# Patient Record
Sex: FEMALE | Race: WHITE | NOT HISPANIC OR LATINO
[De-identification: names, ages, dates, MRNs, and addresses within clinical notes are randomized per-mention and may not be internally consistent; named-entity substitution may affect disease eponyms.]

---

## 2024-01-01 ENCOUNTER — HOSPITAL ENCOUNTER (INPATIENT)
Age: 59
End: 2024-01-01
Attending: SURGERY | Admitting: SURGERY
Payer: COMMERCIAL

## 2024-02-06 ENCOUNTER — DOCUMENTATION (OUTPATIENT)
Dept: TRANSPLANT | Facility: HOSPITAL | Age: 59
End: 2024-02-06
Payer: COMMERCIAL

## 2024-02-06 ENCOUNTER — TELEPHONE (OUTPATIENT)
Dept: TRANSPLANT | Facility: HOSPITAL | Age: 59
End: 2024-02-06
Payer: COMMERCIAL

## 2024-02-06 VITALS — HEIGHT: 62 IN | WEIGHT: 238.1 LBS | BODY MASS INDEX: 43.82 KG/M2

## 2024-02-06 DIAGNOSIS — N18.6 ESRD (END STAGE RENAL DISEASE) (MULTI): Primary | ICD-10-CM

## 2024-02-06 NOTE — PROGRESS NOTES
Do you have difficulty reading or writing in English?   no   What is the primary cause of your kidney disease?  Diabetes  Are you currently on dialysis?   yes  If yes, what days do you have your dialysis treatments?   HHD                                  Have you received a transplant before?   no  If yes, what organ, and when and where was your transplant?     Have you been diagnosed with diabetes?    yes  Have you tested positive for hepatitis or HIV?   yes, Hep C  Have you ever been diagnosed with cancer?   no  If yes, what type of cancer, and when and where were you treated?     Do you have a history of a heart attack or stroke?   Yes, Heart Attack 2017, Treated [facility]  Are you currently or have you previously been seen by a mental health professional?   no  If yes, what is the name of your mental health provider?    Are you a current or former tobacco user?   yes, Quit 2023  Do you have history of alcohol abuse or dependence?   no  Do you have a history of illegal drug abuse or dependence?   no  Has anyone told you they're willing to donate their kidney to you?   yes  Comments:  Intake complete, scheduled virtual [facility].

## 2024-02-16 ENCOUNTER — TELEPHONE (OUTPATIENT)
Dept: TRANSPLANT | Facility: HOSPITAL | Age: 59
End: 2024-02-16
Payer: COMMERCIAL

## 2024-02-23 ENCOUNTER — DOCUMENTATION (OUTPATIENT)
Dept: TRANSPLANT | Facility: HOSPITAL | Age: 59
End: 2024-02-23
Payer: COMMERCIAL

## 2024-02-23 NOTE — PROGRESS NOTES
Per EV BCBS a/e 01/01/23 1600.00 ded 80% 5000.00 oop max, Medicare A&B a/e 10/01/23 & shows secondary, [employer] Medicaid active.  Rec'd fax back from [facility] stating this is a [place] of [place] policy.  Listing approval will be needed.

## 2024-03-05 ENCOUNTER — SOCIAL WORK (OUTPATIENT)
Dept: TRANSPLANT | Facility: HOSPITAL | Age: 59
End: 2024-03-05
Payer: MEDICARE

## 2024-03-05 ENCOUNTER — OFFICE VISIT (OUTPATIENT)
Dept: TRANSPLANT | Facility: HOSPITAL | Age: 59
End: 2024-03-05
Payer: MEDICARE

## 2024-03-05 ENCOUNTER — DOCUMENTATION (OUTPATIENT)
Dept: TRANSPLANT | Facility: HOSPITAL | Age: 59
End: 2024-03-05
Payer: COMMERCIAL

## 2024-03-05 ENCOUNTER — APPOINTMENT (OUTPATIENT)
Dept: TRANSPLANT | Facility: HOSPITAL | Age: 59
End: 2024-03-05
Payer: MEDICARE

## 2024-03-05 ENCOUNTER — LAB (OUTPATIENT)
Dept: LAB | Facility: LAB | Age: 59
End: 2024-03-05
Payer: COMMERCIAL

## 2024-03-05 ENCOUNTER — LAB REQUISITION (OUTPATIENT)
Dept: LAB | Facility: CLINIC | Age: 59
End: 2024-03-05
Payer: COMMERCIAL

## 2024-03-05 VITALS
HEIGHT: 62 IN | BODY MASS INDEX: 42.88 KG/M2 | WEIGHT: 233 LBS | HEART RATE: 71 BPM | DIASTOLIC BLOOD PRESSURE: 77 MMHG | TEMPERATURE: 97.1 F | OXYGEN SATURATION: 100 % | SYSTOLIC BLOOD PRESSURE: 160 MMHG

## 2024-03-05 DIAGNOSIS — E08.22 DIABETES MELLITUS DUE TO UNDERLYING CONDITION WITH CHRONIC KIDNEY DISEASE ON CHRONIC DIALYSIS, WITH LONG-TERM CURRENT USE OF INSULIN (MULTI): ICD-10-CM

## 2024-03-05 DIAGNOSIS — Z99.2 DIABETES MELLITUS DUE TO UNDERLYING CONDITION WITH CHRONIC KIDNEY DISEASE ON CHRONIC DIALYSIS, WITH LONG-TERM CURRENT USE OF INSULIN (MULTI): ICD-10-CM

## 2024-03-05 DIAGNOSIS — N18.6 END STAGE RENAL DISEASE (MULTI): ICD-10-CM

## 2024-03-05 DIAGNOSIS — N18.6 DIABETES MELLITUS DUE TO UNDERLYING CONDITION WITH CHRONIC KIDNEY DISEASE ON CHRONIC DIALYSIS, WITH LONG-TERM CURRENT USE OF INSULIN (MULTI): ICD-10-CM

## 2024-03-05 DIAGNOSIS — Z01.818 PRE-TRANSPLANT EVALUATION FOR KIDNEY TRANSPLANT: Primary | ICD-10-CM

## 2024-03-05 DIAGNOSIS — Z01.818 PRE-TRANSPLANT EVALUATION FOR ESRD (END STAGE RENAL DISEASE): Primary | ICD-10-CM

## 2024-03-05 DIAGNOSIS — Z79.4 DIABETES MELLITUS DUE TO UNDERLYING CONDITION WITH CHRONIC KIDNEY DISEASE ON CHRONIC DIALYSIS, WITH LONG-TERM CURRENT USE OF INSULIN (MULTI): ICD-10-CM

## 2024-03-05 DIAGNOSIS — Z01.818 PRE-TRANSPLANT EVALUATION FOR KIDNEY TRANSPLANT: ICD-10-CM

## 2024-03-05 DIAGNOSIS — Z13.6 ENCOUNTER FOR SCREENING FOR CARDIOVASCULAR DISORDERS: ICD-10-CM

## 2024-03-05 DIAGNOSIS — Z51.81 ENCOUNTER FOR THERAPEUTIC DRUG LEVEL MONITORING: ICD-10-CM

## 2024-03-05 LAB
ABO GROUP (TYPE) IN BLOOD: NORMAL
ALBUMIN SERPL BCP-MCNC: 3.1 G/DL (ref 3.4–5)
ALP SERPL-CCNC: 124 U/L (ref 33–110)
ALT SERPL W P-5'-P-CCNC: 8 U/L (ref 7–45)
AMYLASE SERPL-CCNC: 29 U/L (ref 29–103)
APPEARANCE UR: CLEAR
AST SERPL W P-5'-P-CCNC: 10 U/L (ref 9–39)
BACTERIA #/AREA URNS AUTO: ABNORMAL /HPF
BILIRUB DIRECT SERPL-MCNC: 0.1 MG/DL (ref 0–0.3)
BILIRUB SERPL-MCNC: 0.3 MG/DL (ref 0–1.2)
BILIRUB UR STRIP.AUTO-MCNC: NEGATIVE MG/DL
BUN SERPL-MCNC: 49 MG/DL (ref 6–23)
C PEPTIDE SERPL-MCNC: 5.3 NG/ML (ref 0.7–3.9)
CHOLEST SERPL-MCNC: 87 MG/DL (ref 0–199)
CHOLESTEROL/HDL RATIO: 4.5
CMV IGG AVIDITY SERPL IA-RTO: REACTIVE %
COLOR UR: COLORLESS
CREAT SERPL-MCNC: 3.38 MG/DL (ref 0.5–1.05)
EBV EA IGG SER QL: POSITIVE
EBV NA AB SER QL: POSITIVE
EBV VCA IGG SER IA-ACNC: POSITIVE
EBV VCA IGM SER IA-ACNC: NEGATIVE
EGFRCR SERPLBLD CKD-EPI 2021: 15 ML/MIN/1.73M*2
ERYTHROCYTE [DISTWIDTH] IN BLOOD BY AUTOMATED COUNT: 14.8 % (ref 11.5–14.5)
FLOW AUTOCROSSMATCH: NORMAL
GLUCOSE UR STRIP.AUTO-MCNC: ABNORMAL MG/DL
HBV CORE AB SER QL: NONREACTIVE
HBV SURFACE AB SER-ACNC: 129.2 MIU/ML
HBV SURFACE AG SERPL QL IA: NONREACTIVE
HCT VFR BLD AUTO: 23.2 % (ref 36–46)
HCV AB SER QL: REACTIVE
HCYS SERPL-SCNC: 17.73 UMOL/L (ref 5–13.9)
HDLC SERPL-MCNC: 19.4 MG/DL
HGB BLD-MCNC: 7.3 G/DL (ref 12–16)
HIV 1+2 AB+HIV1 P24 AG SERPL QL IA: NONREACTIVE
HLA CLASS I AB SCREEN,FC: NORMAL
HLA CLASS II AB SCREEN,FC: NORMAL
HLA CLS I TYP PNL BLD/T DONR HIGH RES: NORMAL
HLA RESULTS: NORMAL
HLA-DP2 QL: NORMAL
HLA-DQB1 HIGH RES: NORMAL
HLA-DRB1 HIGH RES: NORMAL
HOLD SPECIMEN: NORMAL
HYALINE CASTS #/AREA URNS AUTO: ABNORMAL /LPF
INR PPP: 1.1 (ref 0.9–1.1)
KETONES UR STRIP.AUTO-MCNC: NEGATIVE MG/DL
LEUKOCYTE ESTERASE UR QL STRIP.AUTO: NEGATIVE
MCH RBC QN AUTO: 30 PG (ref 26–34)
MCHC RBC AUTO-ENTMCNC: 31.5 G/DL (ref 32–36)
MCV RBC AUTO: 96 FL (ref 80–100)
MUCOUS THREADS #/AREA URNS AUTO: ABNORMAL /LPF
NITRITE UR QL STRIP.AUTO: NEGATIVE
NON-HDL CHOLESTEROL: 68 MG/DL (ref 0–149)
NRBC BLD-RTO: 0 /100 WBCS (ref 0–0)
PH UR STRIP.AUTO: 6 [PH]
PHOSPHATE SERPL-MCNC: 2.7 MG/DL (ref 2.5–4.9)
PLATELET # BLD AUTO: 126 X10*3/UL (ref 150–450)
PROT SERPL-MCNC: 6.5 G/DL (ref 6.4–8.2)
PROT UR STRIP.AUTO-MCNC: ABNORMAL MG/DL
PROTHROMBIN TIME: 12.7 SECONDS (ref 9.8–12.8)
RBC # BLD AUTO: 2.43 X10*6/UL (ref 4–5.2)
RBC # UR STRIP.AUTO: ABNORMAL /UL
RBC #/AREA URNS AUTO: ABNORMAL /HPF
RH FACTOR (ANTIGEN D): NORMAL
SP GR UR STRIP.AUTO: 1.01
SQUAMOUS #/AREA URNS AUTO: ABNORMAL /HPF
TREPONEMA PALLIDUM IGG+IGM AB [PRESENCE] IN SERUM OR PLASMA BY IMMUNOASSAY: NONREACTIVE
UROBILINOGEN UR STRIP.AUTO-MCNC: NORMAL MG/DL
VARICELLA ZOSTER IGG INDEX: 4.2 IA
VZV IGG SER QL IA: POSITIVE
WBC # BLD AUTO: 5.8 X10*3/UL (ref 4.4–11.3)
WBC #/AREA URNS AUTO: ABNORMAL /HPF

## 2024-03-05 PROCEDURE — 86481 TB AG RESPONSE T-CELL SUSP: CPT

## 2024-03-05 PROCEDURE — 86706 HEP B SURFACE ANTIBODY: CPT

## 2024-03-05 PROCEDURE — 86780 TREPONEMA PALLIDUM: CPT

## 2024-03-05 PROCEDURE — 81382 HLA II TYPING 1 LOC HR: CPT | Mod: OUT,59 | Performed by: SURGERY

## 2024-03-05 PROCEDURE — 86825 HLA X-MATH NON-CYTOTOXIC: CPT | Mod: OUT | Performed by: SURGERY

## 2024-03-05 PROCEDURE — 86704 HEP B CORE ANTIBODY TOTAL: CPT

## 2024-03-05 PROCEDURE — 84100 ASSAY OF PHOSPHORUS: CPT

## 2024-03-05 PROCEDURE — 86901 BLOOD TYPING SEROLOGIC RH(D): CPT

## 2024-03-05 PROCEDURE — 85027 COMPLETE CBC AUTOMATED: CPT

## 2024-03-05 PROCEDURE — 86665 EPSTEIN-BARR CAPSID VCA: CPT

## 2024-03-05 PROCEDURE — 86900 BLOOD TYPING SEROLOGIC ABO: CPT

## 2024-03-05 PROCEDURE — 85610 PROTHROMBIN TIME: CPT

## 2024-03-05 PROCEDURE — 99215 OFFICE O/P EST HI 40 MIN: CPT

## 2024-03-05 PROCEDURE — 99205 OFFICE O/P NEW HI 60 MIN: CPT | Performed by: TRANSPLANT SURGERY

## 2024-03-05 PROCEDURE — 80307 DRUG TEST PRSMV CHEM ANLYZR: CPT

## 2024-03-05 PROCEDURE — 83718 ASSAY OF LIPOPROTEIN: CPT

## 2024-03-05 PROCEDURE — 99205 OFFICE O/P NEW HI 60 MIN: CPT

## 2024-03-05 PROCEDURE — 36415 COLL VENOUS BLD VENIPUNCTURE: CPT

## 2024-03-05 PROCEDURE — 80076 HEPATIC FUNCTION PANEL: CPT

## 2024-03-05 PROCEDURE — 86663 EPSTEIN-BARR ANTIBODY: CPT

## 2024-03-05 PROCEDURE — 99215 OFFICE O/P EST HI 40 MIN: CPT | Performed by: TRANSPLANT SURGERY

## 2024-03-05 PROCEDURE — 82465 ASSAY BLD/SERUM CHOLESTEROL: CPT

## 2024-03-05 PROCEDURE — 83036 HEMOGLOBIN GLYCOSYLATED A1C: CPT

## 2024-03-05 PROCEDURE — 80364 OPIOID &OPIATE ANALOG 5/MORE: CPT

## 2024-03-05 PROCEDURE — 86803 HEPATITIS C AB TEST: CPT

## 2024-03-05 PROCEDURE — 82150 ASSAY OF AMYLASE: CPT

## 2024-03-05 PROCEDURE — 81001 URINALYSIS AUTO W/SCOPE: CPT

## 2024-03-05 PROCEDURE — 84681 ASSAY OF C-PEPTIDE: CPT

## 2024-03-05 PROCEDURE — 86787 VARICELLA-ZOSTER ANTIBODY: CPT

## 2024-03-05 PROCEDURE — 86664 EPSTEIN-BARR NUCLEAR ANTIGEN: CPT

## 2024-03-05 PROCEDURE — 83090 ASSAY OF HOMOCYSTEINE: CPT

## 2024-03-05 PROCEDURE — 87340 HEPATITIS B SURFACE AG IA: CPT

## 2024-03-05 PROCEDURE — 82565 ASSAY OF CREATININE: CPT

## 2024-03-05 PROCEDURE — 87522 HEPATITIS C REVRS TRNSCRPJ: CPT

## 2024-03-05 PROCEDURE — 80323 ALKALOIDS NOS: CPT

## 2024-03-05 PROCEDURE — 87389 HIV-1 AG W/HIV-1&-2 AB AG IA: CPT

## 2024-03-05 PROCEDURE — 84520 ASSAY OF UREA NITROGEN: CPT

## 2024-03-05 PROCEDURE — 86644 CMV ANTIBODY: CPT

## 2024-03-05 ASSESSMENT — ENCOUNTER SYMPTOMS
DIARRHEA: 0
DYSURIA: 0
DEPRESSION: 0
ABDOMINAL PAIN: 0
ARTHRALGIAS: 0
ABDOMINAL DISTENTION: 0
HALLUCINATIONS: 0
FEVER: 0
OCCASIONAL FEELINGS OF UNSTEADINESS: 1
ADENOPATHY: 0
AGITATION: 0
DIZZINESS: 0
HEMATURIA: 0
LOSS OF SENSATION IN FEET: 1
COLOR CHANGE: 0
WEAKNESS: 0
COUGH: 0
EYES NEGATIVE: 1
LIGHT-HEADEDNESS: 0
CHILLS: 0
CONFUSION: 0
FREQUENCY: 0
SHORTNESS OF BREATH: 0
CONSTIPATION: 0

## 2024-03-05 ASSESSMENT — PAIN SCALES - GENERAL: PAINLEVEL: 0-NO PAIN

## 2024-03-05 NOTE — PROGRESS NOTES
Patient attended class on 03/05/2024.  Accompanied to class with female support.  Oral and written education was provided.  Patient remained attentive throughout the review session, asking appropriate questions.  Evaluation consents were signed.      PRE-TRANSPLANT EDUCATION  Patient received education regarding the following topics as part of their pre-transplant evaluation:  The evaluation process, including:   Transplant team members and roles    Required consultations and testing   Selection criteria and suitability for transplant   Listing process and receiving an organ offer   Psychosocial and financial considerations for a successful transplant   Patient responsibilities, including the necessity of adhering to a strict medical regimen  An overview of the surgical procedure   Potential medical, surgical, and psychosocial risks to transplantation, including:   Wound infection   Pneumonia   Blood clot formation   Organ rejection, failure, and possibility of re-transplantation   Lifetime immunosuppression therapy and associated risks   Arrhythmias and cardiovascular collapse   Multi-organ system failure   Death   Depression   Post-Traumatic Stress Disorder   Generalized anxiety, issues of dependence, and feelings of guilt  Available alternatives to transplantation  Donor risk factors that could affect the success of the transplant and the health of the patient, including:   Donor age   Donor medical and social history   Condition of the organ   Risk of vandana cancer, HIV, Hepatitis B, Hepatitis C, or malaria if the infection is not detectable at the time of donation  Patient's right to withdraw consent for transplantation at any time during the process  Transplants not performed in a Medicare-approved transplant center could affect the patient?s ability to have immunosuppression medication paid for under Medicare part B.   Multiple listing options.    Patient was given the opportunity to have questions  answered. Patient was provided a copy of the informed consent for transplant evaluation.    Signed evaluation informed consent received? 03/05/2024

## 2024-03-05 NOTE — PROGRESS NOTES
"ENCOUNTER    Visit Type Initial Visit  Location: [place] [address]    Barriers to Communication / Understanding:   [] Language [] Vision [] Hearing [] Other      []  Present     Accompanied By: DaughterBarbara    Organ For Transplant:  Kidney    Ethnicity:  White    ADLs Fully Independent      Instrumental ADLs Fully Independent      Level of Activity Sedentary      DME: Wheelchair during assessment, cane      Knowledge of Health Good  Diabetes, HTN, high cholesterol    Why Do You Have End Stage Organ Disease   Pt reported the cause of her kidney disease is the use of certain medications and diabetes.    Knowledge of Transplant / VAD:  Yes Patient Is Able To Make An Informed Decision    Patient Understands the Risks of Transplant / VAD  Yes Rejection  Yes Infection Yes Complications  Yes Death    Patient Understands Recovery and Follow-Up from Transplant / VAD  Yes Length Of State Yes Appointments  Yes Labs  Yes Rehabilitation    Patient Has Identified Goals of Transplant / VAD Yes  Pt reported a goal of transplant is \"a new kidney and to start living again.\"    Any Potential Donors?     Overall Compliance  Good     Pt reported she takes 10 medications daily.    Compliance With Medications Good    Managed By Patient   Pillbox    Understanding Of Medication  Good  Compliance With Appointments Good    How Does Patient Handle Health Problems      Organ  Kidney    IOP:    Fluid Restriction:   No [] Compliant     Medical And Clinic Appointment Compliant     Dialysis:  [x] What Dialysis Center   [facility] B[facility]   July 2023      [] In Center [x] Home Hemo [] Peritoneal       Attendance:  Treatment Attendance Good Treatment Time 5 days a week - Runs for 3 hours    [] Shortened Treatments [] Rescheduled Treatments [] Missed Treatments   Pt reported she missed one treatment since starting dialysis.     Fluids:     Does Patient Still Urinate  [] Fluid Restriction [] IDWG [] EDW  Achieved Dry Weight      " "  Diet:   Patient is compliant with renal restrictions     Patient is compliant with low sodium diet      Labs:    [] Patient Reported [] Collateral       []  Potassium [] Albumin [] Phosphorus      # of Binders:  [x] # of Binders per meal   1 [x] Meals per day   2      []  # of Binders per Snack [] Snacks per day [] Phosphorus     Pancreas:  [] Checks blood sugar      times/day [] A1c   Hypoglycemic Episodes  Unawareness  Outside Interventions    Liver:  Is Lactulose prescribed  Dose:   Timesper day:  Is patient compliant       SOCIAL HISTORY  No       Education:  education:  Diploma    Literate Yes   Computer literate Yes  Internet access Yes       Sources of Income: SSDI ($1,756 monthly)  Patient's Current Employment    [] Full-Time [] Part-Time [] Unemployed [] Retired     []  None [] Not working by choice [x] Not working disabled     [] Short Term Leave   [] Other   Employment History     Will patient have paid status from employment during recovery    Spouse / SO Current Employment     Will spouse / SO have paid status from employment during recovery     Other Sources of Income Total Household Income $21,072 yearly      Does patient have financial concerns   No     Is patient able to meet current monthly expenses   Yes    Resources:    Patient was provided information on transplant fundraising       Insurance:  Primary Insurance Medicare Part A & B    Secondary Insurance Self [place]    Prescription Coverage Copay cost per month $0    Medicare coverage due to     Medicare Part A  Effective date    Medicare Part B  Effective date    Medicare Part C  Deductable  Out of Pocket [employer]    Medicare Part D  Extra Help?    Medicaid  Waiver  Redetermination Date    O       FAMILY SYSTEM    Single     How long   Describe Relationship   Yes How long  8 years ago Describe Relationship \"I don't speak to him\"    When    When  In a Relationship   How Long  Describe " "Relationship    Spouse / SO Name   Age   Health   Other Caregiver Responsibilities  Spouse / Significant others reaction to donation    Children:  Yes # Biological (2 daughters)  # Adopted    # Step Children        Child #1 Nga Parsons  Age 39  Health \"Good\"    Lives With patient  How Much Contact Daily    Child #2 Name Barbara  Age 22  Health \"Good, just overweight\"    Lives With patient  How Much Contact Daily    Child #3 Name Age  Health    Lives   How Much Contact     Parents:  Raised By Both Biological Parents    Did the patient have contact with the other parent     Mother  No Age   Cause of Death   Father  Yes Age 75  Cause of Death Kidney failure, heart disease    Living Parent #1 SubhaLeondra music, daily contact  Living Parent #2    Additional Information    Siblings:  [x] # Biological (1 sister, 1 brother) [] # Half Siblings [] # Step Siblings     Sibling #1 Florecita, Jiankongbao, weekly contact  Sibling #2 Homero, Jiankongbao, weekly contact    Support & Recovery Plan:  Yes Adequate    Primary Support:  Name Barbara Phone [phone]  Age 22  Relationship to Patient Daughter  If employed, can they take time off work Yes   If so, is it paid time off No   If not, will this impact your finances   Did they attend education classes Yes   Do they have other caregiver responsibilities (child or eldercare) No   Do they have their own conditions which may prevent them from providing care for you No  (Medical, psychological, physical limitations)    Are they available on short notice Yes   Are they reliable Yes   Are they responsible Yes   Are they able to understand and process new information Yes   Do they have reliable transportation or will you allow them to use your vehicle Yes   Are they currently involved in your care Yes   Comments  Pt's daughter reported she is a nurse.     Secondary Support:  Name Subha Phone [phone] Age 79  Relationship to Patient Mother  If employed, can they take time " "off work Retired   If so, is it paid time off   If not, will this impact your finances   Did they attend education classes No   Do they have other caregiver responsibilities (child or eldercare) No   Do they have their own conditions which may prevent them from providing care for you No  (Medical, psychological, physical limitations)    Are they available on short notice Yes   Are they reliable Yes   Are they responsible Yes   Are they able to understand and process new information Yes   Do they have reliable transportation or will you allow them to use your vehicle Yes   Are they currently involved in your care Yes   Comments    Alternate Support:  Name Issa Phone [phone] Age 39  Relationship to Patient Daughter  If employed, can they take time off work Yes  If so, is it paid time off   If not, will this impact your finances   Did they attend education classes No   Do they have other caregiver responsibilities (child or eldercare) No   Do they have their own conditions which may prevent them from providing care for you No  (Medical, psychological, physical limitations)    Are they available on short notice Yes   Are they reliable Yes   Are they responsible Yes   Are they able to understand and process new information Yes   Do they have reliable transportation or will you allow them to use your vehicle Yes   Are they currently involved in your care Yes   Comments  Pt's daughter reported she is a nurse.    Alternate Support    Housing:  Yes Adequate Owns home  Type of Home House  Distance to [facility] 1 hr 45 min  Pets 1 cat, 3 dogs  Does Patient Feel Safe in Home Yes      Transportation:  Yes Adequate  # Licensed Drivers in the Home 3  Does Patient Drive Yes If not, why  # Reliable Vehicles 5  Does Patient use Public Transportation No  Does Patient use Medical Transportation No  Comments    MENTAL HEALTH    Cognition:  No impairment observed / reported    The patient reports their mood as \"irritated due to back " "pain, other than that good.\"    Reported Mental Health Diagnosis   Denied  Family History of Mental Health Concerns   Denied  What are patient psychosocial stressors   Pt denied any current stressors.       Current Medications:  No  Mental Health Meds  Denied  Rx'd by   Sleep Meds  Denied  Rx'd by   Pain Meds  Denied  Rx'd by     OTC Meds   Tylenol  Past Medications   Denied    Counseling Never  Pt declined  resources at this time.    Has patient ever been hospitalized for mental health reasons No   Was the hospitalization voluntary  Duration   Where    When  Describe situation    Discharge Plan for Follow Up  Was Discharge Plan Completed   Referral to Transplant Psych   No  Mental Health Follow Up Required      Suicide Assessment:  History of Suicide Ideation No  [] Timeframe [] Frequency   Frequency   Plan Created  Intent to Follow Through  Outcome      History of Suicide Attempt No     History of Suicidal Ideation in the past 3 months No   Intensity   Duration     Description of Plan      Plans thought of  Intent to Follow Through  Highest Level of Intent to Follow Through    Current Plan for Safety    Plan for Follow-Up    Patient's Reported Trauma History   Pt denied any history of trauma.    What are patient's coping behaviors   Pt reported playing games on her phone, watching TV, playing bingo, and fishing as coping behaviors.    Quaker / Spirituality   Spiritual    Attitude toward interviewer Cooperative and Appropriate    Eye Contact Patient maintained good eye contact throughout appointment    Appearance The patient was neatly groomed, appropriately dressed and adequately nourished    Affect Appropriate    Thought Process Appropriate    Substance Use /Abuse History:    Current Tobacco User No  Patient uses   Tobacco Frequency   For How Long  Is Patient Required to Quit     Former Tobacco User Yes  Describe past tobacco use and date quit  Pt reported she stopped smoking cigarettes in April 2023. Pt " "shared she smoked cigarettes for 45 years, smoking 1 1/2 packs a day at her heaviest.     Current Alcohol User No  Type of Alcohol Used   Amount  Frequency   Pattern of Alcohol Use    Is Patient Required to Quit   Continued to use the substance despite being told the substance is affecting their health    History of problems at work, school or home due to substance use      Former Alcohol User Yes  Describe past alcohol use and date quit  Pt reported her last alcohol use was 23 years ago. Pt shared she would drink \"a lot\" a few times a week. Pt reported she would guess the amount was about 8 beers and a couple shots in a day. Pt shared this pattern of alcohol use lasted for 38 years. Pt reported she \"was not an alcoholic, just had a good time.\" Pt shared she could go weeks without drinking and would be okay.    Has patient ever gone to CD treatment No  If yes, When, Where and What type of Program  Attends AA meetings Never   Sponsor  Do support people drink alcohol No  If yes, describe support people's use  Is alcohol kept in the home No  Does Patient need to sign a CD contract     Current Illegal / Unprescribed Drug User No  Type of Illegal Drug Used   Frequency  Pattern of Drug Use    Is Patient Required to Quit     Illegal / Unprescribed Drug #2  Type of Illegal Drug Used   Frequency  Pattern of Drug Use      Continue to use the substance despite being told the substance is affecting their health    History of problems at work, school or home due to substance use      Former Illegal / Unprescribed Drug User No  Describe past illegal drug use and date quit    Has patient ever gone to CD treatment   If yes, When, Where and What type of Program   Attends AA/NA  meetings    Is patient on a Methadone / Suboxone regiment   Do support people use illegal drugs   If yes, describe support people's use  Are illegal drugs kept in the home   Does Patient need to sign a CD contract     Illegal / Unprescribed Drug #2  Type of " "Illegal Drug Used   Frequency  Pattern of Drug Use    Prescription Drug Abuse:  No Has patient experienced feelings of addiction  No Has patient experienced symptoms of withdrawal  No Has patient experienced any side effects? e.g.  hallucinations or delusions    Does Patient Meet the Criteria for Alcohol Use Disorder Yes Diagnosis Alcohol use disorder, moderate, in sustained remission  Does Patient Meet OSOTC guidelines Yes  Does Patient Meet the Criteria for Illegal Drug Use Disorder No Diagnosis  Does Patient Meet OSOTC guidelines Yes    [facility]OTC Substance Relapse Risk Factors   DSM-5 Severity Factors:     IOP     LEGAL ISSUES  No Arrests  No Currently probation or parole No   MCFP No  When   How long   Where       Citizenship:  Yes [place] Citizen   Green Card  Visa    Advance Directives: No  HCPOA   Pt declined documents at this time.    Guardian:    CHEKO LEAL met with Pt and Pt's daughter, Barbara, for initial psychosocial assessment. Pt was pleasant and engaged. Pt reported she has Medicare Part A & B and [place] for insurance. Pt demonstrated a good understanding of the risks of transplant and recovery process. Pt denied any financial concerns at this time. Pt reported the cause of her kidney disease is the use of certain medications and diabetes. Pt reported a goal of transplant is \"a new kidney and to start living again.\" Pt reported good compliance with medications, appointments, and dialysis treatments. Pt listed her daughter, Barbara, as primary support and her mother, Subha, as secondary support. Pt also listed her daughter, Issa, as an alternate support. Pt reported all supports are adequate. Pt reported her mood as \"irritated due to back pain, other than that good.\" Pt denied any MH history. Pt scored a 0 on the PHQ-9 and BRENDA-7, indicating no clinical depression or anxiety. Pt denied any current tobacco, alcohol, or illicit drug use.Pt reported she stopped smoking cigarettes in April 2023. Pt " shared she smoked cigarettes for 45 years, smoking 1 1/2 packs a day at her heaviest. Pt reported her last alcohol use was 23 years ago. Pt shared she would drink 8 beers and a couple shots in a day a few times a week. Pt shared this pattern of alcohol use lasted for 38 years. SW diagnosed Pt with Alcohol use disorder, moderate, in sustained remission. Pt denied any past illicit drug use. Pt scored a 6 on the SIPAT, indicating Pt is an excellent candidate for transplant. SW would recommend listing Pt, and there are no psychosocial barriers at this time.     PLAN  SW to meet with Pt annually. SW to call and confirm secondary support.

## 2024-03-05 NOTE — PROGRESS NOTES
Transplant Nephrology Evaluation :    Opal Sunshine is a 58 y.o. was asked by and by her daughter for Kidney transplant Evaluation .    History in detail : Opal Sunshine is a 58-year-old female with a history of ESRD secondary to long-term diabetes was currently on home hemodialysis 5 days a week.  Her primary nephrologist is  and she currently getting dialyzed through right chest wall catheter.  Have recently issues with AV fistula which was superficial lysed recently.  Still having some residual urine output 800 cc, dry weight is 1.6 kg.  -She was long-term diabetic currently using insulin shots acting 44 units and 88 long-acting insulin.  Diabetic for almost 20+ years denied any eye problems but does have neuropathy and balance issues.  Cannot feel up to the shin.  -She never had any kidney biopsy prior.  -Denied any history of kidney stones.  -No history of blood transfusions.  -Patient had 2 kids and 1 miscarriage  -Denied any psychiatric issues like anxiety depression or bipolar disorder.  -She does have degenerative joint disease for which she got steroid shots recently and also getting evaluated by the neurosurgery for further management.  -She does have a coronary artery disease with 4 stents placed in 2023 and prior have 2 stents in 2017.  Denied any open heart surgery or stroke.  -She does have a diagnosis of obstructive sleep apnea but never used any CPAP, denied any history of asthma or COPD.  -She is getting constipated on and off uses MiraLAX as needed.    Past Medical History : As above    Surgical History: History of gallbladder surgery 25 years ago, fistula placement on the left upper extremity    Family HX: Dad  of kidney issues and heart problems he also had history of COPD.  No cancers in the family no issues with the siblings    Social Connections: Not on file      He lives with the her daughters.  Currently on disability he used to be a manager at [employer], smoked almost  "1 and half pack per day quit in 2023 she did smoking for 45+ years      PROBLEM LIST:  Active Problems:  There are no active Hospital Problems.         ALLERGIES:  Allergies   Allergen Reactions    Insulin Lispro Other     Other Reaction(s): Unknown      Muscle Cramping    Muscle Cramping    Latex Itching    Penicillins Hives, Rash and Unknown            CURRENT MEDICATIONS:  Scheduled medications    Continuous medications    PRN medications         OBJECTIVE:    VITALS: Visit Vitals  /77   Pulse 71   Temp 36.2 °C (97.1 °F) (Temporal)   Ht 1.562 m (5' 1.5\")   Wt 106 kg (233 lb)   SpO2 100%   BMI 43.31 kg/m²   BSA 2.14 m²        General: No distress   Mucosa moist   AI, AC, AF     HEENT: PEERLA  CVS: S1 S2 no murmurs  RESP:  Lungs clear to auscultation , right chest wall tunneled catheter in place  ABDO: Soft, non-tender   Neuro: A + O x 3  Skin: No rash   Extremities: No edema, dorsalis pedis palpable       LABS:        No lab exists for component: \"SODIUM\", \"POTASSIUM\", \"CHLORIDE\", \"BICARBONATE\", \"MAGNESIUM\", \"PHOSPHOROUS\"       [unfilled]       ASSESSMENT AND PLAN:     Opal Sunshine is a 58-year-old female with a history of ESRD secondary to long-term diabetes was currently on home hemodialysis 5 days a week.  Other history include long-term diabetes, CHF, CAD s/p 4 stents recently November 2023, GERD, obstructive sleep apnea not on CPAP, severe obesity, history of long-term smoking, hepatitis C positive which was treated    -Seems to be marginal candidate for kidney transplantation  -Karnofsky score is around 70 unable to walk due to degenerative joint disease in the back as well as severe neuropathy in bilateral lower extremities uses walker all the time.  -She is getting evaluated by pain medicine for her back problems.  -She needs evaluation by a sleep study because she was diagnosed with obstructive sleep apnea but have not used CPAP machine.  -She needs weight management as most of the body " fat is distributed in the lower abdominal area.  -She needs a CAT scan abdomen chest abdomen pelvis to evaluate for vessels as well as pulmonary status due to long-term smoking.  -She needs echocardiogram with a stress test and cardiology clearance she does not qualify for CT cardiac score as the patient had prior stents.  -Also have issuues with low pressures on machine -need dialysis record sheets .  -She did signed up for high KDPI, DCD, hepatitis B and hep -c kidneys.      I had a discussion with this patient regarding 1 year graft and patient survival statistics following renal transplantation for both living and  donor allograft recipients. This data included [facility] data compared to National data readily available for review on https://www.SRTR.org. The patient also had attended the kidney transplant education class provided by the transplant institute.    Further discussion included:  -The transplant selection committee process.  -The need for lifelong immunosuppressive therapy, and the side effects of these medications including the risk of infections, cancer, and lymphoma.  -The wait list time approximately is 5 years or more for  donor transplants and the statistical superiority of a living donor.  -The patient was re-educated regarding the responsibilities of being listed on the transplant waitlist.  - Patient encouraged to avoid blood transfusion unless it's deemed a medically necessary.  -Educated patient on the current allocation policy per [facility] regarding kidney and/or kidney-pancreas/pancreas transplant.  - Education covered the need for monthly serum samples to be drawn and sent to the [facility] while actively listed.  - The patient was told to inform the Pre Transplant office if there are any changes in their medical condition, demographics, insurance coverage ( including medication coverage) and or dialysis units.  - The patient was reminded to fax test  results of studies that were obtained outside  facility.  - Using identified donors with risk criteria for transmission of infection  -Potential transmission of infectious disease from any  donors, as well as living donors.   -The possibility of transmission of tumors and infections via the transplanted organ.  -The inability to completely test for all potential harmful tumors or infectious agents.  -The possibility of listing at multiple locations.    Surgical complications including need for reoperation(s) including but not limited to:  -Bleeding.  -Repair of leaks.  -Control of infection.  -Possible kidney transplant removal.    The medical complications including but not limited to:  -Death.  -Cardiac.  -Pulmonary.  -Infectious.  -Neurologic.  -Other Complications.    We also discussed how the kidney transplant could function:  -Non-function and possible kidney transplant removal within the first 3 to 6 months.  -Delayed graft function (dialysis needed after transplant).  -The potential of recurrence of kidney disease leading to kidney transplant graft loss.    Thank you for consulting :  Jeb Terry MD      Notes created by Mukund -Please excuse the Typos .

## 2024-03-05 NOTE — PROGRESS NOTES
Subjective   Opal Sunshine is a 58 y.o. female who presents for kidney transplant evaluation.    She has significant PMHx including CAD with PCI and stent placement in 2017 and 2023, DMII, HTN, ROSSY, obesity  Currently, she denies chest pain or shortness of breath    Cardiac  she reported having 2 stents placed in 2017 and had repeat LHC with 2 additional stents placed. This was done at [facility]. We will request medical records. She said she had no symptoms in 2023. She is on ASA and plavix    Diabetes  She takes 88 units of lantus at night time and 44 units before meals (2 meals/day). She reported her last A1C was 7.7    Significant smoking history with 1.5 pack/day x 45 years, quit April 2023    Obesity    Review of Systems   Constitutional:  Negative for chills and fever.   HENT: Negative.  Negative for congestion.    Eyes: Negative.    Respiratory:  Negative for cough and shortness of breath.    Cardiovascular:  Negative for chest pain.   Gastrointestinal:  Negative for abdominal distention, abdominal pain, constipation and diarrhea.   Endocrine: Negative for cold intolerance and heat intolerance.   Genitourinary:  Negative for dysuria, frequency, hematuria and urgency.   Musculoskeletal:  Negative for arthralgias.   Skin:  Negative for color change.   Allergic/Immunologic: Negative for environmental allergies.   Neurological:  Negative for dizziness, weakness and light-headedness.   Hematological:  Negative for adenopathy.   Psychiatric/Behavioral:  Negative for agitation, confusion and hallucinations.         Objective   There were no vitals filed for this visit.      Physical Exam  Constitutional:       Appearance: Normal appearance.   HENT:      Head: Normocephalic and atraumatic.      Nose: Nose normal.   Eyes:      Pupils: Pupils are equal, round, and reactive to light.   Cardiovascular:      Rate and Rhythm: Normal rate.   Pulmonary:      Effort: Pulmonary effort is normal. No respiratory  "distress.   Abdominal:      General: There is no distension.      Palpations: Abdomen is soft. There is no mass.      Comments: Central obesity   Musculoskeletal:         General: No swelling. Normal range of motion.      Cervical back: Normal range of motion.   Skin:     General: Skin is warm and dry.   Neurological:      General: No focal deficit present.      Mental Status: She is alert and oriented to person, place, and time.   Psychiatric:         Mood and Affect: Mood normal.         Behavior: Behavior normal.          Lab Review    Blood Type: No results found for: \"ABORH\"  No results found for: \"CREATININE\", \"K\", \"GLUCOSE\", \"HCT\", \"WBC\", \"PLT\", \"CALCIUM\"    Cardiographics      Discussion    I had a discussion with this patient regarding 1 year graft and patient survival statistics following renal transplantation for both living and  donor allograft recipients. This data included [facility] data compared to National data readily available for review on https://www.SRTR.org. The patient also had attended the kidney transplant education class provided by the transplant institute.    The difference between allograft function was discussed comparing living donor, KDPI 0-85%, and >85% kidneys.    Further discussion included:  -The transplant selection committee process.  -The need for lifelong immunosuppressive therapy, and the side effects of these medications including the risk of infections, cancer, and lymphoma.  -The wait list time approximately is 5 years or more for  donor transplants and the statistical superiority of a living donor.    - Using identified donors with risk criteria for transmission of infection  -Potential transmission of infectious disease from any  donors, as well as living donors.   -The possibility of transmission of tumors and infections via the transplanted organ.  -The inability to completely test for all potential harmful tumors or infectious " agents.  -The possibility of listing at multiple locations.    Surgical complications including need for reoperation(s) including but not limited to:  -Bleeding.  -Repair of leaks.  -Control of infection.  -Possible kidney transplant removal.    The medical complications including but not limited to:  -Death.  -Cardiac.  -Pulmonary.  -Infectious.  -Neurologic.  -Other Complications.    We also discussed how the kidney transplant could function:  -Non-function and possible kidney transplant removal within the first 3 to 6 months.  -Delayed graft function (dialysis needed after transplant).  -The potential of recurrence of kidney disease leading to kidney transplant graft loss.       Assessment/Plan    Diagnoses:   1. Pre-transplant evaluation for kidney transplant    2. End stage renal disease (CMS/[facility])    3. Encounter for therapeutic drug level monitoring    4. Encounter for screening for cardiovascular disorders    5. Diabetes mellitus due to underlying condition with chronic kidney disease on chronic dialysis, with long-term current use of insulin (CMS/[facility])      Opal Sunshine is a marginal candidate for kidney transplantation..  She has significant morbidity including coronary artery disease has had a 2 stents placement in 2017 and 2 more stents placed in the coronary in 2023.  We will review her cath report.  This was done at [facility].  She is on Plavix and aspirin.  She had a 70 pack year smoking history quit 1 year ago and insulin-dependent diabetes requiring average 170 units/day of insulin.  Her BMI is 43 with weight centrally located in the abdomen.  I think she will need weight loss to get BMI below 40 before listing.  We will refer her to our cardiologist to further workup her coronary artery disease to assess her candidacy from a cardiac standpoint.  We will additionally make referral to weight loss program.

## 2024-03-06 LAB
EST. AVERAGE GLUCOSE BLD GHB EST-MCNC: 157 MG/DL
HBA1C MFR BLD: 7.1 %
HCV RNA SERPL NAA+PROBE-ACNC: NOT DETECTED K[IU]/ML
HCV RNA SERPL NAA+PROBE-LOG IU: NORMAL {LOG_IU}/ML

## 2024-03-07 LAB
AMPHETAMINES SERPL QL SCN: NEGATIVE NG/ML
ANNOTATION COMMENT IMP: NORMAL
BARBITURATES SERPL QL SCN: NEGATIVE NG/ML
BENZODIAZ SERPL QL SCN: NEGATIVE NG/ML
BUPRENORPHINE SERPL-MCNC: NEGATIVE NG/ML
CANNABINOIDS SERPL QL SCN: NEGATIVE NG/ML
COCAINE SERPL QL SCN: NEGATIVE NG/ML
HLA CLASS I AB SCREEN,FC: NORMAL
HLA CLASS II AB SCREEN,FC: NORMAL
HLA RESULTS: NORMAL
METHADONE SERPL QL SCN: NEGATIVE NG/ML
METHAMPHET SERPL QL: NEGATIVE NG/ML
NIL(NEG) CONTROL SPOT COUNT: NORMAL
OPIATES SERPL QL SCN: NEGATIVE NG/ML
OXYCODONE SERPL QL: POSITIVE NG/ML
PANEL A SPOT COUNT: 0
PANEL B SPOT COUNT: 0
PCP SERPL QL SCN: NEGATIVE NG/ML
POS CONTROL SPOT COUNT: NORMAL
T-SPOT. TB INTERPRETATION: NEGATIVE

## 2024-03-08 LAB
FLOW AUTOCROSSMATCH: NORMAL
HLA RESULTS: NORMAL

## 2024-03-09 LAB
COTININE SERPL-MCNC: 390 NG/ML
HLA CLS I TYP PNL BLD/T DONR HIGH RES: NORMAL
HLA RESULTS: NORMAL
HLA-DP2 QL: NORMAL
HLA-DQB1 HIGH RES: NORMAL
HLA-DRB1 HIGH RES: NORMAL
NICOTINE SERPL-MCNC: 15 NG/ML

## 2024-03-13 ENCOUNTER — DOCUMENTATION (OUTPATIENT)
Dept: TRANSPLANT | Facility: HOSPITAL | Age: 59
End: 2024-03-13
Payer: COMMERCIAL

## 2024-03-13 ENCOUNTER — TELEPHONE (OUTPATIENT)
Dept: TRANSPLANT | Facility: HOSPITAL | Age: 59
End: 2024-03-13
Payer: COMMERCIAL

## 2024-03-13 NOTE — TELEPHONE ENCOUNTER
3/12/24: Spoke to the patient about urinalysis that we performed on 3/5/24.  No culture was completed.  Patient stated that her PCP ordered her a repeat urine test and will follow up on results.  I asked the patient if she had any symptoms of a UTI and she stated that her urine does have a foul smell. Patient will let me know if she is started on any antibiotics.

## 2024-03-13 NOTE — PROGRESS NOTES
SW attempted to reach Pt's secondary support via telephone call to confirm commitment. SW was unable to speak with Pt's secondary support and no VM is set up.    Plan: SW to await return phone call.

## 2024-03-17 LAB
6MAM SERPL-MCNC: <2 NG/ML
CODEINE SERPL-MCNC: <2 NG/ML
HYDROCODONE SERPL-MCNC: <2 NG/ML
HYDROMORPHONE SERPL-MCNC: <2 NG/ML
MORPHINE SERPL-MCNC: <2 NG/ML
OXYCODONE SERPL-MCNC: 5 NG/ML
OXYMORPHONE SERPL-MCNC: <2 NG/ML

## 2024-04-07 ENCOUNTER — DOCUMENTATION (OUTPATIENT)
Dept: TRANSPLANT | Facility: HOSPITAL | Age: 59
End: 2024-04-07
Payer: COMMERCIAL

## 2024-04-07 DIAGNOSIS — Z01.818 PRE-TRANSPLANT EVALUATION FOR KIDNEY TRANSPLANT: Primary | ICD-10-CM

## 2024-04-07 NOTE — PROGRESS NOTES
Late entry for 3/5/2024:  Eval Clinic Note:  Patient attended evaluation appts on 3/5/24 with Dr. Escobedo and Dr. Russ  Medications and allergies reviewed with patient.  Patient presented to appointment with her daughter.  Patient uses walker/WC  History:  Patient has a history of DM  Dialysis: Home hemo 5 x week.  Testing completed recently: CT C/A/P- requested images through PACS, ECHO, Mammogram  Testing needed for evaluation: EVERETT/TBI, chest xray, Pap, Colonoscopy, cardiac risk start, FTC, Weght loss clinic   Listing Consent: signed  Comments:  Provided patient with my contact info for questions and concerns.      LISTING EDUCATION    Patient educated regarding the following prior to placement on the transplant waiting list:   The patient?s medical condition, prognosis, and treatment plan.   The expectations and patient responsibilities while on the waiting list, including:   Keeping the transplant center informed of any changes in contact information or insurance coverage   Notifying the transplant center of any changes in medical status   Required testing and/or re-evaluation appointments while awaiting transplant   An overview of the surgical procedure, including potential risks and alternatives.   Information regarding what to expect during the inpatient admission and recovery period.   A discussion regarding organ offers and types of potential donors, including potential risks that may be associated with specific types of donors that could affect the success of the transplant or the health of the patient.  The right to refuse transplantation.     Patient was given the opportunity to have questions answered. Patient was provided a copy of the informed consent for transplant listing.    Education provided by:  Transplant Coordinator: Liz Sutart RN   Transplant Physician: Dr. Thao Russ    Signed listing informed consent received? YES  Patient agrees to be listed for the following:  KDPI > 85%  [X]  Donors After Circulatory Death (DCD) [X]  Donors with a Positive Core Antibody for Hepatitis B [X]  Donors with Hepatitis C Virus to recipients with hepatitis C []  Donors with Hepatitis C Virus to Negative hepatitis C recipients [X]    Patient will be discussed at an upcoming selection committee to determine eligibility to be placed on the [facility] waiting list.

## 2024-04-08 ENCOUNTER — TELEPHONE (OUTPATIENT)
Dept: TRANSPLANT | Facility: HOSPITAL | Age: 59
End: 2024-04-08
Payer: COMMERCIAL

## 2024-04-15 ENCOUNTER — TELEPHONE (OUTPATIENT)
Dept: TRANSPLANT | Facility: HOSPITAL | Age: 59
End: 2024-04-15
Payer: COMMERCIAL

## 2024-04-18 ENCOUNTER — APPOINTMENT (OUTPATIENT)
Dept: TRANSPLANT | Facility: HOSPITAL | Age: 59
End: 2024-04-18
Payer: MEDICARE

## 2024-04-18 ENCOUNTER — DOCUMENTATION (OUTPATIENT)
Dept: TRANSPLANT | Facility: HOSPITAL | Age: 59
End: 2024-04-18
Payer: COMMERCIAL

## 2024-04-18 NOTE — PROGRESS NOTES
Spoke to patient today via the phone re her BCBS, Medicare & [employer] benefits.  Discussed Medicare ESRD G/L.  Pt started home hemo 07/23.  Patient last worked at [employer] [id number] about 1 year ago & does not know who is paying her insurance premium.  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  She will call Medicare if her BCBS terminates.  She is aware she has benefits for a LD.

## 2024-04-25 ENCOUNTER — HOSPITAL ENCOUNTER (OUTPATIENT)
Dept: RADIOLOGY | Facility: EXTERNAL LOCATION | Age: 59
Discharge: HOME | End: 2024-04-25
Payer: COMMERCIAL

## 2024-04-30 ENCOUNTER — AMBULATORY SURGICAL CENTER (OUTPATIENT)
Dept: URBAN - METROPOLITAN AREA SURGERY 6 | Facility: SURGERY | Age: 59
End: 2024-04-30
Payer: COMMERCIAL

## 2024-04-30 ENCOUNTER — AMBULATORY SURGICAL CENTER (OUTPATIENT)
Dept: URBAN - METROPOLITAN AREA PATHOLOGY 6 | Facility: PATHOLOGY | Age: 59
End: 2024-04-30
Payer: COMMERCIAL

## 2024-04-30 VITALS
OXYGEN SATURATION: 95 % | HEART RATE: 71 BPM | SYSTOLIC BLOOD PRESSURE: 188 MMHG | RESPIRATION RATE: 10 BRPM | SYSTOLIC BLOOD PRESSURE: 194 MMHG | SYSTOLIC BLOOD PRESSURE: 193 MMHG | RESPIRATION RATE: 18 BRPM | WEIGHT: 220 LBS | OXYGEN SATURATION: 99 % | HEART RATE: 70 BPM | HEART RATE: 78 BPM | OXYGEN SATURATION: 99 % | HEART RATE: 66 BPM | SYSTOLIC BLOOD PRESSURE: 163 MMHG | SYSTOLIC BLOOD PRESSURE: 188 MMHG | SYSTOLIC BLOOD PRESSURE: 193 MMHG | RESPIRATION RATE: 9 BRPM | SYSTOLIC BLOOD PRESSURE: 194 MMHG | DIASTOLIC BLOOD PRESSURE: 72 MMHG | OXYGEN SATURATION: 97 % | RESPIRATION RATE: 11 BRPM | OXYGEN SATURATION: 100 % | DIASTOLIC BLOOD PRESSURE: 55 MMHG | RESPIRATION RATE: 11 BRPM | DIASTOLIC BLOOD PRESSURE: 142 MMHG | WEIGHT: 220 LBS | SYSTOLIC BLOOD PRESSURE: 201 MMHG | SYSTOLIC BLOOD PRESSURE: 164 MMHG | DIASTOLIC BLOOD PRESSURE: 55 MMHG | DIASTOLIC BLOOD PRESSURE: 80 MMHG | RESPIRATION RATE: 18 BRPM | SYSTOLIC BLOOD PRESSURE: 163 MMHG | HEART RATE: 78 BPM | DIASTOLIC BLOOD PRESSURE: 80 MMHG | HEART RATE: 72 BPM | DIASTOLIC BLOOD PRESSURE: 72 MMHG | DIASTOLIC BLOOD PRESSURE: 70 MMHG | DIASTOLIC BLOOD PRESSURE: 81 MMHG | HEART RATE: 71 BPM | DIASTOLIC BLOOD PRESSURE: 70 MMHG | SYSTOLIC BLOOD PRESSURE: 164 MMHG | HEIGHT: 62 IN | HEART RATE: 73 BPM | OXYGEN SATURATION: 100 % | DIASTOLIC BLOOD PRESSURE: 81 MMHG | SYSTOLIC BLOOD PRESSURE: 192 MMHG | HEART RATE: 76 BPM | OXYGEN SATURATION: 97 % | TEMPERATURE: 97 F | HEART RATE: 70 BPM | TEMPERATURE: 97 F | HEART RATE: 66 BPM | HEIGHT: 62 IN | RESPIRATION RATE: 9 BRPM | SYSTOLIC BLOOD PRESSURE: 201 MMHG | RESPIRATION RATE: 17 BRPM | RESPIRATION RATE: 12 BRPM | HEART RATE: 72 BPM | SYSTOLIC BLOOD PRESSURE: 192 MMHG | HEART RATE: 73 BPM | RESPIRATION RATE: 10 BRPM | OXYGEN SATURATION: 92 % | OXYGEN SATURATION: 92 % | RESPIRATION RATE: 13 BRPM | HEART RATE: 76 BPM | RESPIRATION RATE: 12 BRPM | RESPIRATION RATE: 13 BRPM | RESPIRATION RATE: 17 BRPM | DIASTOLIC BLOOD PRESSURE: 142 MMHG | OXYGEN SATURATION: 95 %

## 2024-04-30 DIAGNOSIS — K31.89 OTHER DISEASES OF STOMACH AND DUODENUM: ICD-10-CM

## 2024-04-30 DIAGNOSIS — K44.9 DIAPHRAGMATIC HERNIA WITHOUT OBSTRUCTION OR GANGRENE: ICD-10-CM

## 2024-04-30 DIAGNOSIS — K22.89 OTHER SPECIFIED DISEASE OF ESOPHAGUS: ICD-10-CM

## 2024-04-30 DIAGNOSIS — K21.9 GASTRO-ESOPHAGEAL REFLUX DISEASE WITHOUT ESOPHAGITIS: ICD-10-CM

## 2024-04-30 PROBLEM — K31.7 POLYP OF STOMACH AND DUODENUM: Status: ACTIVE | Noted: 2024-04-30

## 2024-04-30 PROBLEM — K20.80 OTHER ESOPHAGITIS WITHOUT BLEEDING: Status: ACTIVE | Noted: 2024-04-30

## 2024-04-30 PROCEDURE — 88305 TISSUE EXAM BY PATHOLOGIST: CPT | Performed by: PATHOLOGY

## 2024-04-30 PROCEDURE — 88342 IMHCHEM/IMCYTCHM 1ST ANTB: CPT | Performed by: PATHOLOGY

## 2024-04-30 PROCEDURE — 43239 EGD BIOPSY SINGLE/MULTIPLE: CPT | Performed by: INTERNAL MEDICINE

## 2024-04-30 PROCEDURE — 88313 SPECIAL STAINS GROUP 2: CPT | Performed by: PATHOLOGY

## 2024-04-30 NOTE — SERVICEROSSYSTEMNOTES
Patient states she has abdominal pain that radiates to her back, constipation. Patient states after she eats she has severe epigastric pain, she will take 2 tums and an hour later she will feel better

## 2024-06-25 ENCOUNTER — APPOINTMENT (OUTPATIENT)
Dept: VASCULAR MEDICINE | Facility: HOSPITAL | Age: 59
End: 2024-06-25
Payer: MEDICARE

## 2024-06-25 ENCOUNTER — APPOINTMENT (OUTPATIENT)
Dept: CARDIOLOGY | Facility: HOSPITAL | Age: 59
End: 2024-06-25
Payer: MEDICARE

## 2024-07-26 ENCOUNTER — POST MORTEM DOCUMENTATION (OUTPATIENT)
Dept: TRANSPLANT | Facility: HOSPITAL | Age: 59
End: 2024-07-26
Payer: COMMERCIAL